# Patient Record
Sex: FEMALE | Race: WHITE | Employment: FULL TIME | URBAN - METROPOLITAN AREA
[De-identification: names, ages, dates, MRNs, and addresses within clinical notes are randomized per-mention and may not be internally consistent; named-entity substitution may affect disease eponyms.]

---

## 2017-10-02 ENCOUNTER — TELEPHONE (OUTPATIENT)
Dept: FAMILY MEDICINE CLINIC | Age: 52
End: 2017-10-02

## 2017-10-06 ENCOUNTER — OFFICE VISIT (OUTPATIENT)
Dept: FAMILY MEDICINE CLINIC | Age: 52
End: 2017-10-06

## 2017-10-06 VITALS
OXYGEN SATURATION: 99 % | TEMPERATURE: 98.2 F | SYSTOLIC BLOOD PRESSURE: 92 MMHG | DIASTOLIC BLOOD PRESSURE: 56 MMHG | HEART RATE: 96 BPM

## 2017-10-06 DIAGNOSIS — N95.1 VAGINAL DRYNESS, MENOPAUSAL: ICD-10-CM

## 2017-10-06 DIAGNOSIS — N12 PYELONEPHRITIS: Primary | ICD-10-CM

## 2017-10-06 DIAGNOSIS — Z12.11 SCREENING FOR COLON CANCER: ICD-10-CM

## 2017-10-06 DIAGNOSIS — M19.049 OA (OSTEOARTHRITIS) OF FINGER, UNSPECIFIED LATERALITY: ICD-10-CM

## 2017-10-06 DIAGNOSIS — Z12.31 ENCOUNTER FOR SCREENING MAMMOGRAM FOR BREAST CANCER: ICD-10-CM

## 2017-10-06 PROCEDURE — 99204 OFFICE O/P NEW MOD 45 MIN: CPT | Performed by: FAMILY MEDICINE

## 2017-10-06 RX ORDER — ESTRADIOL 10 UG/1
10 INSERT VAGINAL DAILY
Qty: 15 TABLET | Refills: 5 | Status: SHIPPED | OUTPATIENT
Start: 2017-10-06 | End: 2018-07-15 | Stop reason: CLARIF

## 2017-10-06 RX ORDER — LEVOFLOXACIN 750 MG/1
1 TABLET ORAL DAILY
COMMUNITY
Start: 2017-09-30 | End: 2017-10-07

## 2017-10-06 ASSESSMENT — ENCOUNTER SYMPTOMS
GASTROINTESTINAL NEGATIVE: 1
RESPIRATORY NEGATIVE: 1

## 2017-10-06 NOTE — PROGRESS NOTES
Psychiatric: She has a normal mood and affect. Her behavior is normal. Judgment and thought content normal.   Nursing note and vitals reviewed. Assessment:      1. Pyelonephritis  Complete antibiotic course. Return to office if any symptoms return. 2. Encounter for screening mammogram for breast cancer  BECKY Digital Screen Bilateral [[id number]]   3. Screening for colon cancer  Follow-up with Dr. Levi Boyle for colonoscopy as planned    4. Vaginal dryness, menopausal  Estradiol (VAGIFEM) 10 MCG TABS vaginal tablet. To use nightly for 2 weeks, then decrease to 3 times per week. Due for Pap in one year    5.  osteoarthritis of the Thumbs (first MTP) - try Aleve once a day or twice a day. 6.  Possible early CTS - trial cock-up splint. Aleve may help with this as well. If any worsening symptoms, return to office for further evaluation.       Plan:

## 2017-10-06 NOTE — MR AVS SNAPSHOT
and go to all appointments, and call your doctor if you are having problems. It's also a good idea to know your test results and keep a list of the medicines you take. How can you care for yourself at home? · Take your antibiotics as directed. Do not stop taking them just because you feel better. You need to take the full course of antibiotics. · Drink plenty of water, enough so that your urine is light yellow or clear like water. This may help wash out bacteria that are causing the infection. If you have kidney, heart, or liver disease and have to limit fluids, talk with your doctor before you increase the amount of fluids you drink. · Urinate often. Try to empty your bladder each time. · To relieve pain, take a hot shower or lay a heating pad (set on low) over your lower belly. Never go to sleep with a heating pad in place. Put a thin cloth between the heating pad and your skin. To help prevent kidney infections  · Drink plenty of water each day. This helps you urinate often, which clears bacteria from your system. If you have kidney, heart, or liver disease and have to limit fluids, talk with your doctor before you increase the amount of fluids you drink. · Urinate when you have the urge. Do not hold your urine for a long time. Urinate before you go to sleep. · If you have symptoms of a bladder infection, such as burning when you urinate or having to urinate often, call your doctor so you can treat the problem before it gets worse. If you do not treat a bladder infection quickly, it can spread to the kidney. · Men should keep the tip of the penis clean. If you are a woman, keep these ideas in mind:  · Urinate right after you have sex. · Change sanitary pads often. Avoid douches, feminine hygiene sprays, and other feminine hygiene products that have deodorants. · After going to the bathroom, wipe from front to back. When should you call for help? Estradiol (VAGIFEM) 10 MCG TABS vaginal tablet Place 1 tablet vaginally daily Use daily for 2 weeks then three times per week as needed    Fish Oil OIL by Does not apply route daily    Probiotic Product ([address]) Take  by mouth. FAMOTIDINE 20 mg by Does not apply route daily. therapeutic multivitamin-minerals (THERAGRAN-M) tablet Take 1 tablet by mouth daily. levofloxacin (LEVAQUIN) 750 MG tablet 1 tablet daily      Allergies           No Known Allergies         Additional Information        Basic Information     Date Of Birth Sex Race Ethnicity Preferred Language    1965 Female White Non-/Non  English      Problem List as of 10/6/2017  Date Reviewed: 10/6/2017                Esophageal spasm      Immunizations as of 10/6/2017     Name Date    Tdap (Boostrix, Adacel) 6/14/2015, 11/29/2011      Preventive Care        Date Due    Mammograms are recommended every 2 years for low/average risk patients aged 48 - 69, and every year for high risk patients per updated national guidelines. However these guidelines can be individualized by your provider. 2/3/2015    Colonoscopy 2/3/2015    Yearly Flu Vaccine (1) 9/1/2017    Pap Smear 10/25/2018    Cholesterol Screening 9/17/2019    Tetanus Combination Vaccine (3 - Td) 6/14/2025            X[employer]rem[employer] Signup           Our records indicate that you have an active [employer] account. You can view your After Visit Summary by going to [email or web address] and logging in with your FitnessKeeper username and password. If you don't have a FitnessKeeper username and password but a parent or guardian has access to your record, the parent or guardian should login with their own FitnessKeeper username and password and access your record to view the After Visit Summary.      Additional Information  If you have questions, please contact the physician practice where you

## 2018-05-27 PROBLEM — Z79.890 HORMONE REPLACEMENT THERAPY: Status: ACTIVE | Noted: 2018-05-27

## 2018-05-27 PROBLEM — I26.99 PE (PULMONARY THROMBOEMBOLISM): Status: ACTIVE | Noted: 2018-05-27

## 2018-05-27 PROBLEM — E03.9 HYPOTHYROIDISM: Status: ACTIVE | Noted: 2018-05-27

## 2018-07-15 ENCOUNTER — APPOINTMENT (OUTPATIENT)
Dept: GENERAL RADIOLOGY | Age: 53
End: 2018-07-15
Payer: COMMERCIAL

## 2018-07-15 ENCOUNTER — HOSPITAL ENCOUNTER (EMERGENCY)
Age: 53
Discharge: HOME | End: 2018-07-15
Attending: EMERGENCY MEDICINE
Payer: COMMERCIAL

## 2018-07-15 VITALS
DIASTOLIC BLOOD PRESSURE: 69 MMHG | BODY MASS INDEX: 23.08 KG/M2 | WEIGHT: 152.31 LBS | SYSTOLIC BLOOD PRESSURE: 113 MMHG | OXYGEN SATURATION: 100 % | RESPIRATION RATE: 16 BRPM | HEART RATE: 65 BPM | TEMPERATURE: 98.3 F | HEIGHT: 68 IN

## 2018-07-15 DIAGNOSIS — S93.492A SPRAIN OF ANTERIOR TALOFIBULAR LIGAMENT OF LEFT ANKLE, INITIAL ENCOUNTER: Primary | ICD-10-CM

## 2018-07-15 PROCEDURE — 73610 X-RAY EXAM OF ANKLE: CPT

## 2018-07-15 PROCEDURE — 99283 EMERGENCY DEPT VISIT LOW MDM: CPT

## 2018-07-15 ASSESSMENT — PAIN DESCRIPTION - PAIN TYPE: TYPE: ACUTE PAIN

## 2018-07-15 ASSESSMENT — PAIN SCALES - GENERAL: PAINLEVEL_OUTOF10: 5

## 2018-07-15 ASSESSMENT — PAIN DESCRIPTION - DESCRIPTORS: DESCRIPTORS: ACHING

## 2018-07-15 ASSESSMENT — PAIN DESCRIPTION - LOCATION: LOCATION: ANKLE

## 2018-07-15 ASSESSMENT — PAIN DESCRIPTION - ORIENTATION: ORIENTATION: LEFT

## 2018-07-15 NOTE — ED NOTES
Bruising and swelling to lateral left ankle toes are warm with brisk cap refill pedal pulse strong left     Genny Anderson RN  07/15/18 [id number]

## 2019-01-14 ENCOUNTER — OFFICE VISIT (OUTPATIENT)
Dept: URGENT CARE | Facility: CLINIC | Age: 54
End: 2019-01-14
Payer: COMMERCIAL

## 2019-01-14 VITALS
WEIGHT: 178 LBS | HEIGHT: 60 IN | BODY MASS INDEX: 34.95 KG/M2 | DIASTOLIC BLOOD PRESSURE: 91 MMHG | OXYGEN SATURATION: 98 % | RESPIRATION RATE: 18 BRPM | TEMPERATURE: 98 F | SYSTOLIC BLOOD PRESSURE: 139 MMHG | HEART RATE: 73 BPM

## 2019-01-14 DIAGNOSIS — L02.91 ABSCESS: Primary | ICD-10-CM

## 2019-01-14 PROCEDURE — 3008F BODY MASS INDEX DOCD: CPT | Mod: CPTII,S$GLB,, | Performed by: FAMILY MEDICINE

## 2019-01-14 PROCEDURE — 99214 PR OFFICE/OUTPT VISIT, EST, LEVL IV, 30-39 MIN: ICD-10-PCS | Mod: S$GLB,,, | Performed by: FAMILY MEDICINE

## 2019-01-14 PROCEDURE — 3008F PR BODY MASS INDEX (BMI) DOCUMENTED: ICD-10-PCS | Mod: CPTII,S$GLB,, | Performed by: FAMILY MEDICINE

## 2019-01-14 PROCEDURE — 99214 OFFICE O/P EST MOD 30 MIN: CPT | Mod: S$GLB,,, | Performed by: FAMILY MEDICINE

## 2019-01-14 RX ORDER — DICLOFENAC SODIUM 75 MG/1
75 TABLET, DELAYED RELEASE ORAL 2 TIMES DAILY
Qty: 60 TABLET | Refills: 1 | Status: SHIPPED | OUTPATIENT
Start: 2019-01-14 | End: 2019-02-12 | Stop reason: ALTCHOICE

## 2019-01-14 RX ORDER — LANSOPRAZOLE 30 MG/1
30 CAPSULE, DELAYED RELEASE ORAL DAILY
COMMUNITY

## 2019-01-14 RX ORDER — DOXYCYCLINE 100 MG/1
100 CAPSULE ORAL 2 TIMES DAILY
Qty: 20 CAPSULE | Refills: 0 | Status: SHIPPED | OUTPATIENT
Start: 2019-01-14 | End: 2019-01-24

## 2019-01-14 RX ORDER — MUPIROCIN 20 MG/G
OINTMENT TOPICAL
Qty: 22 G | Refills: 1 | Status: SHIPPED | OUTPATIENT
Start: 2019-01-14

## 2019-01-14 NOTE — PROGRESS NOTES
Subjective:       Patient ID: Vandana Dacosta is a 53 y.o. female.    Vitals:  height is 5' (1.524 m) and weight is 80.7 kg (178 lb). Her temperature is 98 °F (36.7 °C). Her blood pressure is 139/91 (abnormal) and her pulse is 73. Her respiration is 18 and oxygen saturation is 98%.     Chief Complaint: Mass (pt said the bumps on her head began this weekend)    Pt said the bump on her scalp is getting worse and causing pain to shoot down the side of her head. She has put antibiotic ointment on it with no relief.      Mass   This is a new problem. The current episode started in the past 7 days. The problem occurs constantly. The problem has been gradually worsening. Pertinent negatives include no arthralgias, chest pain, chills, congestion, coughing, fatigue, fever, headaches, joint swelling, myalgias, nausea, rash, sore throat, vertigo, vomiting or weakness. Nothing aggravates the symptoms. She has tried nothing for the symptoms. The treatment provided no relief.       Constitution: Negative for chills, fatigue and fever.   HENT: Negative for congestion and sore throat.    Neck: Negative for painful lymph nodes.   Cardiovascular: Negative for chest pain and leg swelling.   Eyes: Negative for double vision and blurred vision.   Respiratory: Negative for cough and shortness of breath.    Gastrointestinal: Negative for nausea, vomiting and diarrhea.   Genitourinary: Negative for dysuria, frequency, urgency and history of kidney stones.   Musculoskeletal: Positive for pain. Negative for joint pain, joint swelling, muscle cramps and muscle ache.   Skin: Positive for erythema and abscess. Negative for color change, pale, rash and bruising.   Allergic/Immunologic: Negative for seasonal allergies.   Neurological: Negative for dizziness, history of vertigo, light-headedness, passing out and headaches.   Hematologic/Lymphatic: Negative for swollen lymph nodes.   Psychiatric/Behavioral: Negative for nervous/anxious, sleep  disturbance and depression. The patient is not nervous/anxious.        Objective:      Physical Exam   Constitutional: She is oriented to person, place, and time. She appears well-developed and well-nourished.   HENT:   Head: Normocephalic and atraumatic. Head is without abrasion, without contusion and without laceration.   Nose: Nose normal.   Eyes: Conjunctivae, EOM and lids are normal.   Neck: Trachea normal, full passive range of motion without pain and phonation normal. Neck supple.   Cardiovascular: Normal rate.   Pulmonary/Chest: Effort normal. No stridor. No respiratory distress.   Musculoskeletal: Normal range of motion.   Neurological: She is alert and oriented to person, place, and time.   Skin: Skin is warm, dry and intact. Capillary refill takes less than 2 seconds. No abrasion, no bruising, no burn, no ecchymosis, no laceration, no lesion and no rash noted. There is erythema.        Psychiatric: She has a normal mood and affect. Her speech is normal and behavior is normal. Judgment and thought content normal. Cognition and memory are normal.   Nursing note and vitals reviewed.      Assessment:       1. Abscess        Plan:         Abscess    Other orders  -     mupirocin (BACTROBAN) 2 % ointment; Apply to affected area 3 times daily  Dispense: 22 g; Refill: 1  -     diclofenac (VOLTAREN) 75 MG EC tablet; Take 1 tablet (75 mg total) by mouth 2 (two) times daily.  Dispense: 60 tablet; Refill: 1  -     doxycycline (VIBRAMYCIN) 100 MG Cap; Take 1 capsule (100 mg total) by mouth 2 (two) times daily. for 10 days  Dispense: 20 capsule; Refill: 0     pt needs refill on diclofenac. Advised warm showers to scalp.

## 2019-02-12 ENCOUNTER — OFFICE VISIT (OUTPATIENT)
Dept: URGENT CARE | Facility: CLINIC | Age: 54
End: 2019-02-12
Payer: COMMERCIAL

## 2019-02-12 VITALS
HEIGHT: 60 IN | WEIGHT: 178 LBS | DIASTOLIC BLOOD PRESSURE: 94 MMHG | SYSTOLIC BLOOD PRESSURE: 148 MMHG | HEART RATE: 77 BPM | OXYGEN SATURATION: 98 % | RESPIRATION RATE: 18 BRPM | BODY MASS INDEX: 34.95 KG/M2 | TEMPERATURE: 98 F

## 2019-02-12 DIAGNOSIS — M25.50 ARTHRALGIA, UNSPECIFIED JOINT: Primary | ICD-10-CM

## 2019-02-12 DIAGNOSIS — L65.9 ALOPECIA: ICD-10-CM

## 2019-02-12 PROCEDURE — 3008F BODY MASS INDEX DOCD: CPT | Mod: CPTII,S$GLB,, | Performed by: PHYSICIAN ASSISTANT

## 2019-02-12 PROCEDURE — 3008F PR BODY MASS INDEX (BMI) DOCUMENTED: ICD-10-PCS | Mod: CPTII,S$GLB,, | Performed by: PHYSICIAN ASSISTANT

## 2019-02-12 PROCEDURE — 99214 OFFICE O/P EST MOD 30 MIN: CPT | Mod: S$GLB,,, | Performed by: PHYSICIAN ASSISTANT

## 2019-02-12 PROCEDURE — 99214 PR OFFICE/OUTPT VISIT, EST, LEVL IV, 30-39 MIN: ICD-10-PCS | Mod: S$GLB,,, | Performed by: PHYSICIAN ASSISTANT

## 2019-02-12 RX ORDER — MELOXICAM 15 MG/1
15 TABLET ORAL DAILY
Qty: 30 TABLET | Refills: 1 | Status: SHIPPED | OUTPATIENT
Start: 2019-02-12

## 2019-02-12 RX ORDER — KETOCONAZOLE 20 MG/ML
SHAMPOO, SUSPENSION TOPICAL
Qty: 120 ML | Refills: 0 | Status: SHIPPED | OUTPATIENT
Start: 2019-02-14 | End: 2019-05-06 | Stop reason: SDUPTHER

## 2019-02-12 NOTE — PATIENT INSTRUCTIONS
Understanding Alopecia Areata    Alopecia areata is a condition that causes your hair to fall out. It causes bald patches on the scalp, but it can also cause hair loss on other parts of the body.  How to say it  [id number]   What causes alopecia areata?  Alopecia areata is an autoimmune disease. This means its caused by the bodys immune system attacking its own tissues. The immune system attacks the hair follicles. It causes hair to stop growing, and then break off and fall out.  You may be more likely to have alopecia areata if you have another type of autoimmune disease, such as:  · Thyroid disease  · Vitiligo  · Eczema (atopic dermatitis)  Symptoms of alopecia areata  The main symptom is one or more bald patches on the scalp that occur over a few weeks as hair falls out. Bald patches most often occur on the scalp, but hair can also fall out on the face and other parts of the body. The skin may itch or burn before the hair falls out. Then the skin may be smooth, or have some short hairs left. In some people, the rest of the hair on the head and the entire body may also thin or fall out.  Some people also have small dents or pits in their fingernails, or other nail symptoms. These may include roughness, cracks, red spots, or the nail pulling away from the finger.  Treatment for alopecia areata  You can choose not to have any treatment. For many people, the hair will grow back in time. In some cases, it may fall out again. Treatment doesnt prevent hair from falling out in the future.  Some medicines can help regrow hair faster, or stop hair from falling out. These medicines include:  · Corticosteroid medicine. This is injected into the areas where hair is falling out or gone. The injections are done every 4 to 12 weeks. Corticosteroid medicine can also be used as an ointment or cream put on the skin. These are often used along with the injections.  · Immunotherapy medicine. This is a type of  medicine that causes an allergic reaction on the skin. You apply it once a week as a lotion onto the skin of the scalp.  · Topical minoxidil. You put this over-the-counter medicine right on the scalp. It may help new hair grow.  · Other medicines. Many other medicines can be used, but they may suppress the immune system. They can have unwanted side effects.  Medicines used for treatment have side effects. They also work better on some people than others. It depends on how severe your hair loss is. Talk with your healthcare provider about what medicines are the best options for you.  Living with alopecia areata  For many people, the hair grows back within a year. But your hair may fall out again the future. This process may occur a few times over several years. Or the hair may not fully grow back. In some people, all the scalp hair or body hair may fall out.  Hair loss is more likely to happen again if you have any of these:  · Hair loss for more than 1 year  · Nail symptoms  · A family history of alopecia areata  · Hair loss that started in childhood  · Loss of a lot of hair  · Loss of hair in a band from the ears around the back of the head  Hair loss can cause stress and other emotional upset. Talk with your healthcare provider about finding support groups in your area to help you manage your condition. You can also talk to him or her about cosmetic fixes. A wig can be used to conceal bald patches. Tattooing of the eyebrows can restore the look of eyebrow hairs. Your healthcare provider may have resources to help.  When to call your healthcare provider  Call your healthcare provider right away if you have any of these:  · Symptoms that dont get better, or get worse  · New symptoms   Date Last Reviewed: 5/1/2016  © 6780-4955 [employer]. [address] [place] [address], [place] [address]. All rights reserved. This information is not intended as a substitute for professional medical care. Always follow your  healthcare professional's instructions.       If not allergic,take tylenol (acetominophen) for fever control, chills, or body aches every 4 hours. Do not exceed 4000 mg/ day.If not allergic, take Motrin (Ibuprofen) every 4 hours for fever, chills, pain or inflammation. Do not exceed 2400 mg/day. You can alternate taking tylenol and motrin.  If you were prescribed a narcotic medication, do not drive or operate heavy equipment or machinery while taking these medications.  You must understand that you've received an Urgent Care treatment only and that you may be released before all your medical problems are known or treated. You, the patient, will arrange for follow up care as instructed.  Follow up with your PCP or specialty clinic as directed in the next 1-2 weeks if not improved or as needed.  You can call [phone] to schedule an appointment with the appropriate provider.  If your condition worsens we recommend that you receive another evaluation at the emergency room immediately or contact your primary medical clinics after hours call service to discuss your concerns.  Please return here or go to the Emergency Department for any concerns or worsening of condition.

## 2019-02-12 NOTE — PROGRESS NOTES
Subjective:       Patient ID: Vandana Dacosta is a 54 y.o. female.    Vitals:  height is 5' (1.524 m) and weight is 80.7 kg (178 lb). Her temperature is 97.5 °F (36.4 °C). Her blood pressure is 148/94 (abnormal) and her pulse is 77. Her respiration is 18 and oxygen saturation is 98%.     Chief Complaint: Insect Bite    Patient was seen a month ago for a bite on her head, has finished a round of antibiotics. But it has gotten painful and swollen again. No drainage.       Insect Bite   This is a recurrent problem. The current episode started more than 1 month ago. The problem occurs constantly. The problem has been unchanged. Pertinent negatives include no arthralgias, chest pain, chills, congestion, coughing, fatigue, fever, headaches, joint swelling, myalgias, nausea, rash, sore throat, vertigo, vomiting or weakness.       Constitution: Negative for chills, fatigue and fever.   HENT: Negative for congestion and sore throat.    Neck: Negative for painful lymph nodes.   Cardiovascular: Negative for chest pain and leg swelling.   Eyes: Negative for double vision and blurred vision.   Respiratory: Negative for cough and shortness of breath.    Gastrointestinal: Negative for nausea, vomiting and diarrhea.   Genitourinary: Negative for dysuria, frequency, urgency and history of kidney stones.   Musculoskeletal: Negative for joint pain, joint swelling, muscle cramps and muscle ache.   Skin: Positive for color change. Negative for pale, rash, erythema and bruising.   Allergic/Immunologic: Negative for seasonal allergies.   Neurological: Negative for dizziness, history of vertigo, light-headedness, passing out and headaches.   Hematologic/Lymphatic: Negative for swollen lymph nodes.   Psychiatric/Behavioral: Negative for nervous/anxious, sleep disturbance and depression. The patient is not nervous/anxious.        Objective:           Physical Exam   Constitutional: She is oriented to person, place, and time. She appears  well-developed and well-nourished.   HENT:   Head: Normocephalic and atraumatic. Head is without abrasion, without contusion and without laceration. Hair is abnormal.       Right Ear: External ear normal.   Left Ear: External ear normal.   Nose: Nose normal.   Mouth/Throat: Oropharynx is clear and moist.   Eyes: Conjunctivae, EOM and lids are normal. Pupils are equal, round, and reactive to light.   Neck: Trachea normal, full passive range of motion without pain and phonation normal. Neck supple.   Cardiovascular: Normal rate, regular rhythm and normal heart sounds.   Pulmonary/Chest: Effort normal and breath sounds normal. No stridor. No respiratory distress.   Musculoskeletal: Normal range of motion.   Neurological: She is alert and oriented to person, place, and time.   Skin: Skin is warm, dry and intact. Capillary refill takes less than 2 seconds. No abrasion, no bruising, no burn, no ecchymosis, no laceration, no lesion and no rash noted. No erythema.   Psychiatric: She has a normal mood and affect. Her speech is normal and behavior is normal. Judgment and thought content normal. Cognition and memory are normal.   Nursing note and vitals reviewed.      Assessment:       1. Arthralgia, unspecified joint    2. Alopecia        Plan:         Arthralgia, unspecified joint  -     meloxicam (MOBIC) 15 MG tablet; Take 1 tablet (15 mg total) by mouth once daily. Take with food  Dispense: 30 tablet; Refill: 1  -     Lyme Disease, Ab Total W/Refl; Future; Expected date: 02/13/2019    Alopecia  -     Ambulatory referral to Dermatology  -     ketoconazole (NIZORAL) 2 % shampoo; Apply topically twice a week.  Dispense: 120 mL; Refill: 0  -     Lyme Disease, Ab Total W/Refl; Future; Expected date: 02/13/2019      Patient Instructions       Understanding Alopecia Areata    Alopecia areata is a condition that causes your hair to fall out. It causes bald patches on the scalp, but it can also cause hair loss on other parts of the  body.  How to say it  [id number]   What causes alopecia areata?  Alopecia areata is an autoimmune disease. This means its caused by the bodys immune system attacking its own tissues. The immune system attacks the hair follicles. It causes hair to stop growing, and then break off and fall out.  You may be more likely to have alopecia areata if you have another type of autoimmune disease, such as:  · Thyroid disease  · Vitiligo  · Eczema (atopic dermatitis)  Symptoms of alopecia areata  The main symptom is one or more bald patches on the scalp that occur over a few weeks as hair falls out. Bald patches most often occur on the scalp, but hair can also fall out on the face and other parts of the body. The skin may itch or burn before the hair falls out. Then the skin may be smooth, or have some short hairs left. In some people, the rest of the hair on the head and the entire body may also thin or fall out.  Some people also have small dents or pits in their fingernails, or other nail symptoms. These may include roughness, cracks, red spots, or the nail pulling away from the finger.  Treatment for alopecia areata  You can choose not to have any treatment. For many people, the hair will grow back in time. In some cases, it may fall out again. Treatment doesnt prevent hair from falling out in the future.  Some medicines can help regrow hair faster, or stop hair from falling out. These medicines include:  · Corticosteroid medicine. This is injected into the areas where hair is falling out or gone. The injections are done every 4 to 12 weeks. Corticosteroid medicine can also be used as an ointment or cream put on the skin. These are often used along with the injections.  · Immunotherapy medicine. This is a type of medicine that causes an allergic reaction on the skin. You apply it once a week as a lotion onto the skin of the scalp.  · Topical minoxidil. You put this over-the-counter medicine right on the  scalp. It may help new hair grow.  · Other medicines. Many other medicines can be used, but they may suppress the immune system. They can have unwanted side effects.  Medicines used for treatment have side effects. They also work better on some people than others. It depends on how severe your hair loss is. Talk with your healthcare provider about what medicines are the best options for you.  Living with alopecia areata  For many people, the hair grows back within a year. But your hair may fall out again the future. This process may occur a few times over several years. Or the hair may not fully grow back. In some people, all the scalp hair or body hair may fall out.  Hair loss is more likely to happen again if you have any of these:  · Hair loss for more than 1 year  · Nail symptoms  · A family history of alopecia areata  · Hair loss that started in childhood  · Loss of a lot of hair  · Loss of hair in a band from the ears around the back of the head  Hair loss can cause stress and other emotional upset. Talk with your healthcare provider about finding support groups in your area to help you manage your condition. You can also talk to him or her about cosmetic fixes. A wig can be used to conceal bald patches. Tattooing of the eyebrows can restore the look of eyebrow hairs. Your healthcare provider may have resources to help.  When to call your healthcare provider  Call your healthcare provider right away if you have any of these:  · Symptoms that dont get better, or get worse  · New symptoms   Date Last Reviewed: 5/1/2016  © 4110-0298 [employer]. [address] [place] [address], [place] [address]. All rights reserved. This information is not intended as a substitute for professional medical care. Always follow your healthcare professional's instructions.       If not allergic,take tylenol (acetominophen) for fever control, chills, or body aches every 4 hours. Do not exceed 4000 mg/ day.If not allergic, take  Motrin (Ibuprofen) every 4 hours for fever, chills, pain or inflammation. Do not exceed 2400 mg/day. You can alternate taking tylenol and motrin.  If you were prescribed a narcotic medication, do not drive or operate heavy equipment or machinery while taking these medications.  You must understand that you've received an Urgent Care treatment only and that you may be released before all your medical problems are known or treated. You, the patient, will arrange for follow up care as instructed.  Follow up with your PCP or specialty clinic as directed in the next 1-2 weeks if not improved or as needed.  You can call [phone] to schedule an appointment with the appropriate provider.  If your condition worsens we recommend that you receive another evaluation at the emergency room immediately or contact your primary medical clinics after hours call service to discuss your concerns.  Please return here or go to the Emergency Department for any concerns or worsening of condition.

## 2019-02-25 ENCOUNTER — INITIAL CONSULT (OUTPATIENT)
Dept: DERMATOLOGY | Facility: CLINIC | Age: 54
End: 2019-02-25
Payer: COMMERCIAL

## 2019-02-25 VITALS — RESPIRATION RATE: 18 BRPM | BODY MASS INDEX: 34.95 KG/M2 | HEIGHT: 60 IN | WEIGHT: 178 LBS

## 2019-02-25 DIAGNOSIS — L73.9 FOLLICULITIS: ICD-10-CM

## 2019-02-25 DIAGNOSIS — L63.9 ALOPECIA AREATA: Primary | ICD-10-CM

## 2019-02-25 PROCEDURE — 3008F BODY MASS INDEX DOCD: CPT | Mod: CPTII,S$GLB,, | Performed by: DERMATOLOGY

## 2019-02-25 PROCEDURE — 99999 PR PBB SHADOW E&M-EST. PATIENT-LVL III: CPT | Mod: PBBFAC,,, | Performed by: DERMATOLOGY

## 2019-02-25 PROCEDURE — 99202 PR OFFICE/OUTPT VISIT, NEW, LEVL II, 15-29 MIN: ICD-10-PCS | Mod: S$GLB,,, | Performed by: DERMATOLOGY

## 2019-02-25 PROCEDURE — 99999 PR PBB SHADOW E&M-EST. PATIENT-LVL III: ICD-10-PCS | Mod: PBBFAC,,, | Performed by: DERMATOLOGY

## 2019-02-25 PROCEDURE — 99202 OFFICE O/P NEW SF 15 MIN: CPT | Mod: S$GLB,,, | Performed by: DERMATOLOGY

## 2019-02-25 PROCEDURE — 3008F PR BODY MASS INDEX (BMI) DOCUMENTED: ICD-10-PCS | Mod: CPTII,S$GLB,, | Performed by: DERMATOLOGY

## 2019-02-25 RX ORDER — FLUCONAZOLE 150 MG/1
150 TABLET ORAL DAILY
Qty: 2 TABLET | Refills: 0 | Status: SHIPPED | OUTPATIENT
Start: 2019-02-25 | End: 2019-02-26

## 2019-02-25 RX ORDER — DOXYCYCLINE 100 MG/1
100 CAPSULE ORAL EVERY 12 HOURS
Qty: 60 CAPSULE | Refills: 0 | Status: SHIPPED | OUTPATIENT
Start: 2019-02-25 | End: 2019-04-09 | Stop reason: SDUPTHER

## 2019-02-25 RX ORDER — FLUOCINONIDE TOPICAL SOLUTION USP, 0.05% 0.5 MG/ML
SOLUTION TOPICAL 2 TIMES DAILY
Qty: 20 ML | Refills: 1 | Status: SHIPPED | OUTPATIENT
Start: 2019-02-25 | End: 2019-05-06 | Stop reason: SDUPTHER

## 2019-02-25 NOTE — PROGRESS NOTES
Subjective:       Patient ID:  Vandana Dacosta is a 54 y.o. female who presents for   Chief Complaint   Patient presents with    Hair Loss     53 yo F presents for hair loss and itchy scalp x approx 7 wks.  When this originally started, there was soreness, swelling, and drainage of an area on her L parietal scalp. Additionally, her symptoms included joint pains that she thinks improved with a short course of doxycycline (treated for possible tick bite; she works with animals and it was thought that this could all be due to a tick bite).   She also noticed some itchy bumps on her scalp.    She has also been treating with ketoconazole (NIZORAL) 2 % shampoo    Patient was seen a month ago for a bite on her head, has finished a round of doxycycline     Past Medical History:  Asthma          Review of Systems   Musculoskeletal: Positive for arthralgias (improved).   Skin: Positive for itching. Negative for dry skin, sensitivity to antibiotic ointment and sensitivity to bandage adhesive.   Psychiatric/Behavioral: Positive for high stress (lost her job; looking for new job).   Hematologic/Lymphatic: Does not bruise/bleed easily.        Objective:    Physical Exam   Constitutional: She appears well-developed and well-nourished.   HENT:   Mouth/Throat: Lips normal.    Eyes: Lids are normal.    Neurological: She is alert and oriented to person, place, and time.   Psychiatric: She has a normal mood and affect.   Skin:   Areas Examined (abnormalities noted in diagram):   Scalp / Hair Palpated and Inspected  Head / Face Inspection Performed  Neck Inspection Performed  Chest / Axilla Inspection Performed  RUE Inspected  LUE Inspection Performed              Diagram Legend     Erythematous scaling macule/papule c/w actinic keratosis       Vascular papule c/w angioma      Pigmented verrucoid papule/plaque c/w seborrheic keratosis      Yellow umbilicated papule c/w sebaceous hyperplasia      Irregularly shaped tan macule c/w  lentigo     1-2 mm smooth white papules consistent with Milia      Movable subcutaneous cyst with punctum c/w epidermal inclusion cyst      Subcutaneous movable cyst c/w pilar cyst      Firm pink to brown papule c/w dermatofibroma      Pedunculated fleshy papule(s) c/w skin tag(s)      Evenly pigmented macule c/w junctional nevus     Mildly variegated pigmented, slightly irregular-bordered macule c/w mildly atypical nevus      Flesh colored to evenly pigmented papule c/w intradermal nevus       Pink pearly papule/plaque c/w basal cell carcinoma      Erythematous hyperkeratotic cursted plaque c/w SCC      Surgical scar with no sign of skin cancer recurrence      Open and closed comedones      Inflammatory papules and pustules      Verrucoid papule consistent consistent with wart     Erythematous eczematous patches and plaques     Dystrophic onycholytic nail with subungual debris c/w onychomycosis     Umbilicated papule    Erythematous-base heme-crusted tan verrucoid plaque consistent with inflamed seborrheic keratosis     Erythematous Silvery Scaling Plaque c/w Psoriasis     See annotation      Assessment / Plan:        Alopecia areata  -     fluocinonide (LIDEX) 0.05 % external solution; Apply topically 2 (two) times daily. scalp  Dispense: 20 mL; Refill: 1    Folliculitis  -     doxycycline (VIBRAMYCIN) 100 MG Cap; Take 1 capsule (100 mg total) by mouth every 12 (twelve) hours.  Dispense: 60 capsule; Refill: 0  -     fluconazole (DIFLUCAN) 150 MG Tab; Take 1 tablet (150 mg total) by mouth once daily. Repeat if needed for 1 day  Dispense: 2 tablet; Refill: 0    Unsure of the original insult/issue involving the scalp in Jan since there is significant improvement.  Ruptured EIC resulting in overlying alopecia? Abscess? There is a resulting patch of alopecia that seems to be improving.  Will add topical steroid to help stimulate additional hair growth.  Will add doxycycline for additional anti-inflammatory effect.  Provided Diflucan since patient is prone to yeast infections    Pt will get labs as ordered by her PCP         Follow-up in about 2 months (around 4/25/2019).

## 2019-02-25 NOTE — LETTER
February 26, 2019      Josh Shaw PA-C  [id number] [facility] [address]           [facility]  [address] [facility] [address] [place] [address]  Phone: [phone]  Fax: [phone]          Patient: Vandana Dacosta   MR Number: [id number]   YOB: 1965   Date of Visit: 2/25/2019       Dear Josh Shaw:    Thank you for referring Vandana Dacosta to me for evaluation. Attached you will find relevant portions of my assessment and plan of care.    If you have questions, please do not hesitate to call me. I look forward to following Vandana Dacosta along with you.    Sincerely,    Anya Bush MD    Enclosure  CC:  No Recipients    If you would like to receive this communication electronically, please contact [email or web address] or [phone] to request more information on [employer] access.    For providers and/or their staff who would like to refer a patient to [facility], please contact us through our one-stop-shop provider referral line, [facility], at [phone].    If you feel you have received this communication in error or would no longer like to receive these types of communications, please e-mail [email or web address]

## 2019-03-06 ENCOUNTER — OFFICE VISIT (OUTPATIENT)
Dept: DERMATOLOGY | Facility: CLINIC | Age: 54
End: 2019-03-06
Payer: COMMERCIAL

## 2019-03-06 VITALS — BODY MASS INDEX: 39.66 KG/M2 | RESPIRATION RATE: 17 BRPM | WEIGHT: 202 LBS | HEIGHT: 60 IN

## 2019-03-06 DIAGNOSIS — L55.9 SUNBURN: Primary | ICD-10-CM

## 2019-03-06 PROCEDURE — 99999 PR PBB SHADOW E&M-EST. PATIENT-LVL III: CPT | Mod: PBBFAC,,, | Performed by: DERMATOLOGY

## 2019-03-06 PROCEDURE — 99212 PR OFFICE/OUTPT VISIT, EST, LEVL II, 10-19 MIN: ICD-10-PCS | Mod: S$GLB,,, | Performed by: DERMATOLOGY

## 2019-03-06 PROCEDURE — 99212 OFFICE O/P EST SF 10 MIN: CPT | Mod: S$GLB,,, | Performed by: DERMATOLOGY

## 2019-03-06 PROCEDURE — 3008F PR BODY MASS INDEX (BMI) DOCUMENTED: ICD-10-PCS | Mod: CPTII,S$GLB,, | Performed by: DERMATOLOGY

## 2019-03-06 PROCEDURE — 99999 PR PBB SHADOW E&M-EST. PATIENT-LVL III: ICD-10-PCS | Mod: PBBFAC,,, | Performed by: DERMATOLOGY

## 2019-03-06 PROCEDURE — 3008F BODY MASS INDEX DOCD: CPT | Mod: CPTII,S$GLB,, | Performed by: DERMATOLOGY

## 2019-03-06 RX ORDER — METRONIDAZOLE 500 MG/1
TABLET ORAL
Refills: 3 | COMMUNITY
Start: 2019-02-25

## 2019-03-06 RX ORDER — TRIAMCINOLONE ACETONIDE 0.25 MG/G
CREAM TOPICAL
Qty: 30 G | Refills: 3 | Status: SHIPPED | OUTPATIENT
Start: 2019-03-06

## 2019-03-06 NOTE — PROGRESS NOTES
Pt comes in today with facial swelling. Pt was in the sun yesterday, used sunscreen. Pt not sure if this is a medication reaction. Pt states her face burns however it does not burn like a sunburn usually burns.

## 2019-03-06 NOTE — PROGRESS NOTES
Subjective:       Patient ID:  Vandana Dacosta is a 54 y.o. female who presents for   Chief Complaint   Patient presents with    Allergic Reaction     facial swelling; been on doxycycline since 2/25/19     54 year old female comes in today with facial swelling. Pt was in the sun yesterday and skin started to burn while in the sun. , used sunscreen. Pt not sure if this is a medication reaction. Pt states her face burns however it does not burn like a sunburn usually burns. Applied Cetaphil.     Patient was last seen 2/25/29 and was prescribed doxycycline for folliculitis by Dr. Anya Bush            Review of Systems     Objective:    Physical Exam   Constitutional: She appears well-developed and well-nourished. No distress.   Neurological: She is alert and oriented to person, place, and time. She is not disoriented.   Psychiatric: She has a normal mood and affect.   Skin:   Areas Examined (abnormalities noted in diagram):   Head / Face Inspection Performed  Neck Inspection Performed                  Diagram Legend     Erythematous scaling macule/papule c/w actinic keratosis       Vascular papule c/w angioma      Pigmented verrucoid papule/plaque c/w seborrheic keratosis      Yellow umbilicated papule c/w sebaceous hyperplasia      Irregularly shaped tan macule c/w lentigo     1-2 mm smooth white papules consistent with Milia      Movable subcutaneous cyst with punctum c/w epidermal inclusion cyst      Subcutaneous movable cyst c/w pilar cyst      Firm pink to brown papule c/w dermatofibroma      Pedunculated fleshy papule(s) c/w skin tag(s)      Evenly pigmented macule c/w junctional nevus     Mildly variegated pigmented, slightly irregular-bordered macule c/w mildly atypical nevus      Flesh colored to evenly pigmented papule c/w intradermal nevus       Pink pearly papule/plaque c/w basal cell carcinoma      Erythematous hyperkeratotic cursted plaque c/w SCC      Surgical scar with no sign of skin cancer  recurrence      Open and closed comedones      Inflammatory papules and pustules      Verrucoid papule consistent consistent with wart     Erythematous eczematous patches and plaques     Dystrophic onycholytic nail with subungual debris c/w onychomycosis     Umbilicated papule    Erythematous-base heme-crusted tan verrucoid plaque consistent with inflamed seborrheic keratosis     Erythematous Silvery Scaling Plaque c/w Psoriasis     See annotation      Assessment / Plan:        Sunburn 2/2 photosensitivity from doxycycline  -     triamcinolone acetonide 0.025% (KENALOG) 0.025 % cream; AAA bid  Dispense: 30 g; Refill: 3    Recommend cool compresses +/- aloe vera.   Recommend taking NSAIDs for decrease in inflammation  Rx given for TMC 0.025% - okay to use BID x 2 weeks then stop. SE discussed.            Follow-up for as previously scheduled with Dr. li.

## 2019-03-18 ENCOUNTER — OFFICE VISIT (OUTPATIENT)
Dept: DERMATOLOGY | Facility: CLINIC | Age: 54
End: 2019-03-18
Payer: COMMERCIAL

## 2019-03-18 VITALS — WEIGHT: 202 LBS | RESPIRATION RATE: 16 BRPM | BODY MASS INDEX: 39.66 KG/M2 | HEIGHT: 60 IN

## 2019-03-18 DIAGNOSIS — L73.9 FOLLICULITIS: Primary | ICD-10-CM

## 2019-03-18 DIAGNOSIS — L63.9 ALOPECIA AREATA: ICD-10-CM

## 2019-03-18 DIAGNOSIS — L81.4 LENTIGINES: ICD-10-CM

## 2019-03-18 PROCEDURE — 3008F PR BODY MASS INDEX (BMI) DOCUMENTED: ICD-10-PCS | Mod: CPTII,S$GLB,, | Performed by: DERMATOLOGY

## 2019-03-18 PROCEDURE — 99213 PR OFFICE/OUTPT VISIT, EST, LEVL III, 20-29 MIN: ICD-10-PCS | Mod: 25,S$GLB,, | Performed by: DERMATOLOGY

## 2019-03-18 PROCEDURE — 99213 OFFICE O/P EST LOW 20 MIN: CPT | Mod: 25,S$GLB,, | Performed by: DERMATOLOGY

## 2019-03-18 PROCEDURE — 11900 INJECT SKIN LESIONS </W 7: CPT | Mod: S$GLB,,, | Performed by: DERMATOLOGY

## 2019-03-18 PROCEDURE — 3008F BODY MASS INDEX DOCD: CPT | Mod: CPTII,S$GLB,, | Performed by: DERMATOLOGY

## 2019-03-18 PROCEDURE — 99999 PR PBB SHADOW E&M-EST. PATIENT-LVL III: ICD-10-PCS | Mod: PBBFAC,,, | Performed by: DERMATOLOGY

## 2019-03-18 PROCEDURE — 11900 PR INJECTION INTO SKIN LESIONS, UP TO 7: ICD-10-PCS | Mod: S$GLB,,, | Performed by: DERMATOLOGY

## 2019-03-18 PROCEDURE — 99999 PR PBB SHADOW E&M-EST. PATIENT-LVL III: CPT | Mod: PBBFAC,,, | Performed by: DERMATOLOGY

## 2019-03-18 RX ORDER — HYDROQUINONE 40 MG/G
CREAM TOPICAL 2 TIMES DAILY
Qty: 46 G | Refills: 1 | Status: SHIPPED | OUTPATIENT
Start: 2019-03-18 | End: 2019-04-17

## 2019-03-18 RX ORDER — PREDNISONE 10 MG/1
10 TABLET ORAL DAILY
COMMUNITY

## 2019-03-18 NOTE — PROGRESS NOTES
Subjective:       Patient ID:  Vandana Dacosta is a 54 y.o. female who presents for   Chief Complaint   Patient presents with    Follow-up    Hair Loss     Last Derm OV 3-6-19 with Dr Hernandez for sunburn after 3 hours in the sun with sunscreen.  She had started doxycycline approx 1 week prior    Alopecia areata-improving with new hair growth present  -     fluocinonide (LIDEX) 0.05 % external solution; Apply topically 2 (two) times daily. scalp      Folliculitis-improving   -     doxycycline (VIBRAMYCIN) 100 MG Cap    She would also like a refill of lightening cream for the sun spots on her face    Past Medical History:  Asthma                 Review of Systems   Musculoskeletal: Positive for arthralgias (improved).   Skin: Positive for itching and recent sunburn. Negative for dry skin, sensitivity to antibiotic ointment, sensitivity to bandage adhesive and tendency to form keloidal scars.   Psychiatric/Behavioral: Positive for high stress (lost her job; looking for new job).   Hematologic/Lymphatic: Does not bruise/bleed easily.        Objective:    Physical Exam   Constitutional: She appears well-developed and well-nourished.   HENT:   Mouth/Throat: Lips normal.    Eyes: Lids are normal.    Neurological: She is alert and oriented to person, place, and time.   Psychiatric: She has a normal mood and affect.   Skin:   Areas Examined (abnormalities noted in diagram):   Scalp / Hair Palpated and Inspected  Head / Face Inspection Performed  Neck Inspection Performed              Diagram Legend     Erythematous scaling macule/papule c/w actinic keratosis       Vascular papule c/w angioma      Pigmented verrucoid papule/plaque c/w seborrheic keratosis      Yellow umbilicated papule c/w sebaceous hyperplasia      Irregularly shaped tan macule c/w lentigo     1-2 mm smooth white papules consistent with Milia      Movable subcutaneous cyst with punctum c/w epidermal inclusion cyst      Subcutaneous movable cyst c/w pilar  cyst      Firm pink to brown papule c/w dermatofibroma      Pedunculated fleshy papule(s) c/w skin tag(s)      Evenly pigmented macule c/w junctional nevus     Mildly variegated pigmented, slightly irregular-bordered macule c/w mildly atypical nevus      Flesh colored to evenly pigmented papule c/w intradermal nevus       Pink pearly papule/plaque c/w basal cell carcinoma      Erythematous hyperkeratotic cursted plaque c/w SCC      Surgical scar with no sign of skin cancer recurrence      Open and closed comedones      Inflammatory papules and pustules      Verrucoid papule consistent consistent with wart     Erythematous eczematous patches and plaques     Dystrophic onycholytic nail with subungual debris c/w onychomycosis     Umbilicated papule    Erythematous-base heme-crusted tan verrucoid plaque consistent with inflamed seborrheic keratosis     Erythematous Silvery Scaling Plaque c/w Psoriasis     See annotation      Assessment / Plan:        Alopecia areata-improving  Ok to use Lidex soln bid   Intralesional Kenalog 10 mg/cc (0.2 cc total) injected into 1 lesion on the scalp today after obtaining verbal consent including risk of surrounding hypopigmentation. Patient tolerated procedure well.    Units: 1  NDC for Kenalog 10mg/cc:  1518-3147-03    Folliculitis  Improving with Lidex soln  Continue to use daily-bid prn flare    Lentigines  Encouraged daily sun protection as any sun exposure can exacerbate this hyperpigmentation  -     hydroquinone 4 % Crea; Apply topically 2 (two) times daily.  Dispense: 46 g; Refill: 1  Ok to use tretinoin qhs as well           Follow-up for moving to [place] next month. moving to [facility] 4/5-4/6/19

## 2019-03-21 ENCOUNTER — TELEPHONE (OUTPATIENT)
Dept: DERMATOLOGY | Facility: CLINIC | Age: 54
End: 2019-03-21

## 2019-03-21 NOTE — TELEPHONE ENCOUNTER
Called into Pharmacy .         ----- Message from Vernell Hogan sent at 3/21/2019 11:04 AM CDT -----  Contact: Vandana  Type:  RX Refill Request    Who Called:  patient  Refill or New Rx:  New Rx  RX Name and Strength:  hydroquinone 4 % Crea  How is the patient currently taking it? (ex. 1XDay):  Apply topically 2 (two) times daily  Is this a 30 day or 90 day RX:  30  Preferred Pharmacy with phone number:    [id number] - [place], [place] - [address] [facility] [address]  [address] [address]  Phone: [phone] Fax: [phone]  Local or Mail Order:  local  Ordering Provider:  Dr Anya Hickman Call Back Number:  [phone]  Additional Information:   Patient went to pharmacy to  Rx but was not there. Please call when sent to pharmacy. Thanks!

## 2019-04-09 DIAGNOSIS — L73.9 FOLLICULITIS: ICD-10-CM

## 2019-04-09 RX ORDER — DOXYCYCLINE 100 MG/1
100 CAPSULE ORAL EVERY 12 HOURS
Qty: 60 CAPSULE | Refills: 0 | Status: SHIPPED | OUTPATIENT
Start: 2019-04-09

## 2019-05-06 DIAGNOSIS — L63.9 ALOPECIA AREATA: ICD-10-CM

## 2019-05-06 DIAGNOSIS — L65.9 ALOPECIA: ICD-10-CM

## 2019-05-06 RX ORDER — KETOCONAZOLE 20 MG/ML
SHAMPOO, SUSPENSION TOPICAL
Qty: 120 ML | Refills: 0 | Status: SHIPPED | OUTPATIENT
Start: 2019-05-06

## 2019-05-06 RX ORDER — FLUOCINONIDE TOPICAL SOLUTION USP, 0.05% 0.5 MG/ML
SOLUTION TOPICAL 2 TIMES DAILY
Qty: 20 ML | Refills: 1 | Status: SHIPPED | OUTPATIENT
Start: 2019-05-06

## 2020-11-10 NOTE — PATIENT INSTRUCTIONS
[facility] ER on 11/080/2020 fractured ribs on left, patient needs a RTW letter for 2 weeks.   
quickly, it can spread to the kidney. · Men should keep the tip of the penis clean. If you are a woman, keep these ideas in mind:  · Urinate right after you have sex. · Change sanitary pads often. Avoid douches, feminine hygiene sprays, and other feminine hygiene products that have deodorants. · After going to the bathroom, wipe from front to back. When should you call for help? Call your doctor now or seek immediate medical care if:  · You have increasing pain in your back just below the rib cage. This is called flank pain. · You have a new or higher fever and chills. · You are vomiting or nauseated. Watch closely for changes in your health, and be sure to contact your doctor if:  · Symptoms, such as burning when you urinate, get worse or get better but then come back. · You are not getting better after 2 days. Where can you learn more? Go to [email or web address] and sign in to your [employer] account. Enter G454 in the [employer] box to learn more about \"Kidney Infection: Care Instructions. \"     If you do not have an account, please click on the \"Sign Up Now\" link. Current as of: November 28, 2016  Content Version: 11.3  © 3229-9270 [employer]. Care instructions adapted under license by [facility] ([facility]). If you have questions about a medical condition or this instruction, always ask your healthcare professional. [place] [address] any warranty or liability for your use of this information.